# Patient Record
Sex: FEMALE | Race: WHITE
[De-identification: names, ages, dates, MRNs, and addresses within clinical notes are randomized per-mention and may not be internally consistent; named-entity substitution may affect disease eponyms.]

---

## 2017-04-17 ENCOUNTER — HOSPITAL ENCOUNTER (EMERGENCY)
Dept: HOSPITAL 25 - UCCORT | Age: 47
Discharge: HOME | End: 2017-04-17
Payer: COMMERCIAL

## 2017-04-17 VITALS — SYSTOLIC BLOOD PRESSURE: 140 MMHG | DIASTOLIC BLOOD PRESSURE: 88 MMHG

## 2017-04-17 DIAGNOSIS — H60.91: Primary | ICD-10-CM

## 2017-04-17 DIAGNOSIS — Z90.49: ICD-10-CM

## 2017-04-17 DIAGNOSIS — H61.21: ICD-10-CM

## 2017-04-17 DIAGNOSIS — Z88.1: ICD-10-CM

## 2017-04-17 DIAGNOSIS — J45.909: ICD-10-CM

## 2017-04-17 PROCEDURE — 99213 OFFICE O/P EST LOW 20 MIN: CPT

## 2017-04-17 PROCEDURE — G0463 HOSPITAL OUTPT CLINIC VISIT: HCPCS

## 2017-04-17 NOTE — UC
Ear Complaint HPI





- HPI Summary


HPI Summary: 





Patient is a 46yo otherwise healthy who arrives to  with c/o of R ear pain 

since 0400 this morning.  She states having been ill since Friday with a URI.  

Denies fever.  Pain noticed first this morning and has not improved.  She feels 

it may be clogged, but is also very irritated.  Denies chest congestion, runny 

nose, HA, cough or other viral symptoms.  She does not have a significant 

history of ear infections and has no tubes.  She has tried Tylenol with minimal 

relief. Denies other injuries or pain today. 





- History of Current Complaint


Chief Complaint: UCEar


Stated Complaint: RIGHT EAR COMPLAINT


Time Seen by Provider: 04/17/17 10:06


Hx Obtained From: Patient


Hx Last Menstrual Period: 04/07/17


Pregnant?: No


Onset/Duration: Sudden Onset


Severity Initially: Moderate


Severity Currently: Moderate


Pain Intensity: 6


Pain Scale Used: 0-10 Numeric


Alleviating Factors: OTC Meds


Associated Signs/Symptoms: Positive: Hearing Loss, Swelling @, URI Symptoms





- Allergies/Home Medications


Allergies/Adverse Reactions: 


 Allergies











Allergy/AdvReac Type Severity Reaction Status Date / Time


 


Erythromycin AdvReac  Vomiting Verified 04/17/17 10:07











Home Medications: 


 Home Medications





Acetaminophen [Tylenol 8 Hour] 650 mg PO ONCE PRN 04/17/17 [History Confirmed 04 /17/17]


traZODone TAB* [Desyrel TAB*] 100 mg PO BEDTIME 04/17/17 [History Confirmed 04/ 17/17]











PMH/Surg Hx/FS Hx/Imm Hx


Previously Healthy: Yes


Respiratory History Of: Reports: Asthma





- Surgical History


Surgical History: Yes


Surgery Procedure, Year, and Place: Cholecystectomy, 2002, [place]





- Family History


Known Family History: Positive: Unknown





- Social History


Occupation: Employed Full-time


Lives: With Family


Alcohol Use: Rare


Substance Use Type: None


Smoking Status (MU): Never Smoked Tobacco


Have You Smoked in the Last Year: No





Review of Systems


Constitutional: Negative


Skin: Negative


Eyes: Negative


ENT: Ear Ache, Other - cerumen impaction


Respiratory: Negative


Motor: Negative


Neurovascular: Negative


Musculoskeletal: Negative


Psychological: Negative


All Other Systems Reviewed And Are Negative: Yes





Physical Exam


Triage Information Reviewed: Yes


Appearance: Well-Appearing, No Pain Distress, Well-Nourished


Vital Signs: 


 Initial Vital Signs











Temp  98.8 F   04/17/17 10:03


 


Pulse  88   04/17/17 10:03


 


Resp  16   04/17/17 10:03


 


BP  140/88   04/17/17 10:03


 


Pulse Ox  100   04/17/17 10:03











Vital Signs Reviewed: Yes


Eye Exam: Normal


Eyes: Positive: Conjunctiva Clear


ENT: Positive: Pharynx normal, TM red - right


Neck exam: Normal


Neck: Positive: Supple, Nontender, No Lymphadenopathy


Respiratory Exam: Normal


Respiratory: Positive: Chest non-tender, Lungs clear


Cardiovascular Exam: Normal


Cardiovascular: Positive: RRR


Musculoskeletal Exam: Normal


Musculoskeletal: Positive: Strength Intact


Neurological Exam: Normal


Psychological: Positive: Normal Response To Family, Age Appropriate Behavior


Skin Exam: Normal





Ear Complaint Course/Dx





- Course


Course Of Treatment: Ear irrigation performed of right ear. Patient tolerated 

well.  Right ear with cerumen impaction and erythema around the TM and canal.  

Patient states pain is 10/10.  No obvious drainage, but collection of fluid 

inside the canal.  Patient has history of swimmers ear.  Patient given 

ofloxacin drops and encouraged to take tylenol for ear pain.  Patient will 

follow up with PCP.





- Differential Dx/Diagnosis


Differential Diagnosis/HQI/PQRI: Cerumen Impaction, Foreign Body, Otitis Externa

, Otitis Media


Provider Diagnoses: Otitis Externa





Discharge





- Discharge Plan


Condition: Stable


Disposition: HOME


Prescriptions: 


Ofloxacin 0.3% OTIC.SOL* [Floxin 0.3% OTIC.SOL*] 1 drop OTIC ONCE #1 btl


Patient Education Materials:  Otitis Externa (ED)


Referrals: 


Carol Daugherty MD [Primary Care Provider] - 


Additional Instructions: 


Instill 10 drops into affected ear(s) once daily for 7 days


Tylenol as needed for pain and inflammation of the ear


Follow up with PCP


If symptoms become worse, come back to .

## 2017-07-14 ENCOUNTER — HOSPITAL ENCOUNTER (EMERGENCY)
Dept: HOSPITAL 25 - UCCORT | Age: 47
Discharge: LEFT BEFORE DISCHARGE | End: 2017-07-14
Payer: COMMERCIAL

## 2017-07-14 DIAGNOSIS — Z53.21: ICD-10-CM

## 2017-07-14 DIAGNOSIS — M54.5: Primary | ICD-10-CM

## 2018-03-19 ENCOUNTER — HOSPITAL ENCOUNTER (EMERGENCY)
Dept: HOSPITAL 25 - UCCORT | Age: 48
Discharge: LEFT BEFORE DISCHARGE | End: 2018-03-19
Payer: COMMERCIAL

## 2018-03-19 DIAGNOSIS — J34.89: Primary | ICD-10-CM

## 2018-03-19 DIAGNOSIS — Z53.21: ICD-10-CM

## 2018-03-19 DIAGNOSIS — H92.01: ICD-10-CM

## 2018-03-25 ENCOUNTER — HOSPITAL ENCOUNTER (EMERGENCY)
Dept: HOSPITAL 25 - UCCORT | Age: 48
Discharge: HOME | End: 2018-03-25
Payer: COMMERCIAL

## 2018-03-25 VITALS — DIASTOLIC BLOOD PRESSURE: 56 MMHG | SYSTOLIC BLOOD PRESSURE: 116 MMHG

## 2018-03-25 DIAGNOSIS — J32.9: Primary | ICD-10-CM

## 2018-03-25 DIAGNOSIS — Z88.3: ICD-10-CM

## 2018-03-25 PROCEDURE — G0463 HOSPITAL OUTPT CLINIC VISIT: HCPCS

## 2018-03-25 PROCEDURE — 99212 OFFICE O/P EST SF 10 MIN: CPT

## 2018-03-25 NOTE — UC
Throat Pain/Nasal John HPI





- HPI Summary


HPI Summary: 





Pt presents with sinus pain/pressure/congestion and b/l ear pressure for 1 

week. Sore throat started 3-4 days ago. Has not been taking anything OTC. 

Denies fever, chills, SOB, chest pain, abdominal pain, n/v/d/c.








- History of Current Complaint


Stated Complaint: EAR,THROAT COMPLAINT


Time Seen by Provider: 03/25/18 18:17


Hx Obtained From: Patient


Hx Last Menstrual Period: 04/07/17


Onset/Duration: Gradual Onset


Severity: Mild


Pain Intensity: 4


Pain Scale Used: 0-10 Numeric





- Allergies/Home Medications


Allergies/Adverse Reactions: 


 Allergies











Allergy/AdvReac Type Severity Reaction Status Date / Time


 


erythromycin AdvReac  Vomiting Uncoded 03/25/18 18:21











Home Medications: 


 Home Medications





Acetaminophen [Acetaminophen Extra Strength] 1,000 mg PO SEE INSTRUCTIONS PRN 03 /25/18 [History Confirmed 03/25/18]


Ibuprofen [Goodsense Ibuprofen] 400 mg PO ONCE PRN 03/25/18 [History Confirmed 

03/25/18]











PMH/Surg Hx/FS Hx/Imm Hx


Previously Healthy: Yes





- Surgical History


Surgical History: Yes


Surgery Procedure, Year, and Place: Cholecystectomy, 2002, [place]





- Family History


Known Family History: Positive: Unknown





- Social History


Occupation: Employed Full-time


Lives: With Family


Alcohol Use: Rare


Substance Use Type: None


Smoking Status (MU): Never Smoked Tobacco


Have You Smoked in the Last Year: No





Review of Systems


Constitutional: Negative


Skin: Negative


Eyes: Negative


ENT: Sore Throat, Ear Ache, Nasal Discharge, Sinus Congestion, Sinus Pain/

Tenderness


Respiratory: Negative


Cardiovascular: Negative


Gastrointestinal: Negative


Neurovascular: Negative


Neurological: Negative


Psychological: Negative


All Other Systems Reviewed And Are Negative: Yes





Physical Exam





- Summary


Physical Exam Summary: 





GENERAL:  NAD. WDWN


HEENT:  NC/AT.  Conjunctiva clear without inflammation or discharge. TMs intact

, no bulging, erythema, or edema. Nasal mucosa mildly swollen and erythematous 

with clear discharge. TTP maxillary and frontal sinus. Posterior oropharynx 

without exudates, erythema, or tonsillar enlargement.  Uvula midline.


NECK:  Supple without lymphadenopathy


CHEST:  CTAB. No r/r/w. No accessory muscle use. Breathing comfortably and in 

no distress.


CV:  RRR. Without m/r/g. Pulses intact.


SKIN:  No rash or erythema noted.


NEURO: Alert. CN II-XII grossly intact.


PSYCH: Age appropriate behavior.


Triage Information Reviewed: Yes





Throat Pain/Nasal Course/Dx





- Course


Course Of Treatment: Sinusitis - Amoxicillin





- Differential Dx/Diagnosis


Provider Diagnoses: Sinusitis





Discharge





- Sign-Out/Discharge


Documenting (check all that apply): Discharge





- Discharge Plan


Condition: Stable


Disposition: HOME


Prescriptions: 


Amoxicillin PO (*) [Amoxicillin 500 MG CAP*] 500 mg PO Q12H #20 cap


Patient Education Materials:  Sinusitis (ED)


Referrals: 


Carol Daugherty MD [Primary Care Provider] - 


Additional Instructions: 


If you develop a fever, shortness of breath, chest pain, new or worsening 

symptoms - please call your PCP or go to the ED.


 








- Billing Disposition and Condition


Condition: STABLE


Disposition: HOME